# Patient Record
Sex: FEMALE | Race: BLACK OR AFRICAN AMERICAN | URBAN - METROPOLITAN AREA
[De-identification: names, ages, dates, MRNs, and addresses within clinical notes are randomized per-mention and may not be internally consistent; named-entity substitution may affect disease eponyms.]

---

## 2017-10-16 ENCOUNTER — OFFICE VISIT (OUTPATIENT)
Dept: FAMILY MEDICINE CLINIC | Age: 42
End: 2017-10-16

## 2017-10-16 VITALS
TEMPERATURE: 98.3 F | BODY MASS INDEX: 33.8 KG/M2 | SYSTOLIC BLOOD PRESSURE: 178 MMHG | RESPIRATION RATE: 17 BRPM | HEIGHT: 64 IN | WEIGHT: 198 LBS | OXYGEN SATURATION: 100 % | HEART RATE: 42 BPM | DIASTOLIC BLOOD PRESSURE: 96 MMHG

## 2017-10-16 DIAGNOSIS — R01.1 HEART MURMUR: ICD-10-CM

## 2017-10-16 DIAGNOSIS — I10 ESSENTIAL HYPERTENSION WITH GOAL BLOOD PRESSURE LESS THAN 140/90: Primary | ICD-10-CM

## 2017-10-16 DIAGNOSIS — R00.1 BRADYCARDIA: ICD-10-CM

## 2017-10-16 RX ORDER — LISINOPRIL AND HYDROCHLOROTHIAZIDE 10; 12.5 MG/1; MG/1
1 TABLET ORAL DAILY
Qty: 30 TAB | Refills: 5 | Status: SHIPPED | OUTPATIENT
Start: 2017-10-16 | End: 2018-10-15 | Stop reason: SDUPTHER

## 2017-10-16 NOTE — PROGRESS NOTES
HPI  Giovanny Liang is a 39 y.o. female being seen today for   Chief Complaint   Patient presents with    Medication Refill     Patient feels \"ill\" when taking HTN  meds- last pill 6 March 2017    . she states that she was seen a year ago, had 6 mos of bp meds and now is out for a month. Stressed out lately due to custody hearing. Per pt she was evalutaed last year at [facility] for elevated bp and she had a cardiac MRI when she was there due to concern of cardiac sarcoid. Per pt everything was normal and she did not need any specific follow up. Past Medical History:   Diagnosis Date    Hypertension 2007         ROS  Patient states that she is feeling well. Denies complaints of chest pain, shortness of breath, swelling of legs, dizziness or weakness. she denies nausea, vomiting or diarrhea. Current Outpatient Prescriptions   Medication Sig    lisinopril-hydroCHLOROthiazide (PRINZIDE, ZESTORETIC) 10-12.5 mg per tablet Take 1 Tab by mouth daily.  amLODIPine (NORVASC) 5 mg tablet Take 1 Tab by mouth daily. No current facility-administered medications for this visit. PE  Visit Vitals    BP (!) 178/96 (BP 1 Location: Right arm, BP Patient Position: Sitting)    Pulse (!) 42    Temp 98.3 °F (36.8 °C) (Oral)    Resp 17    Ht 5' 4\" (1.626 m)    Wt 198 lb (89.8 kg)    LMP 10/16/2017 (Exact Date)    SpO2 100%    BMI 33.99 kg/m2        Alert and oriented with normal mood and affect. she is well developed and well nourished . Lungs are clear without wheezing. Heart rate is regular with 3/6 systolic murmur. There is no lower extremity edema. Assessment and Plan:        ICD-10-CM ICD-9-CM    1. Essential hypertension with goal blood pressure less than 140/90 I10 401.9    2. Heart murmur R01.1 785.2    3.  Bradycardia R00.1 427.89      Change meds to lisin/hctz 10/12.5 for cost  rtc 2 mos recheck bp and labs  [facility]    -on [facility], cannot view MRI but can see part of H and P and there was concern for heart block.     As pt is persistently bradycardic, will refer for EKG and request full records from Adarsh Garcia MD

## 2017-10-16 NOTE — MR AVS SNAPSHOT
Visit Information Date & Time Provider Department Dept. Phone Encounter #  
 10/16/2017 12:15 PM [facility] [id number] Upcoming Health Maintenance Date Due DTaP/Tdap/Td series (1 - Tdap) 10/28/1996 PAP AKA CERVICAL CYTOLOGY 9/24/2016 INFLUENZA AGE 9 TO ADULT 8/1/2017 Allergies as of 10/16/2017  Review Complete On: 10/16/2017 By: Pipe Gloria No Known Allergies Current Immunizations  Never Reviewed No immunizations on file. Not reviewed this visit You Were Diagnosed With   
  
 Codes Comments Essential hypertension with goal blood pressure less than 140/90    -  Primary ICD-10-CM: I10 
ICD-9-CM: 401.9 Heart murmur     ICD-10-CM: R01.1 ICD-9-CM: 785.2 Bradycardia     ICD-10-CM: R00.1 ICD-9-CM: 427.89 Vitals BP Pulse Temp Resp Height(growth percentile) Weight(growth percentile) (!) 178/96 (BP 1 Location: Right arm, BP Patient Position: Sitting) (!) 42 98.3 °F (36.8 °C) (Oral) 17 5' 4\" (1.626 m) 198 lb (89.8 kg) LMP SpO2 BMI OB Status Smoking Status 10/16/2017 (Exact Date) 100% 33.99 kg/m2 Having regular periods Never Smoker Vitals History BMI and BSA Data Body Mass Index Body Surface Area  
 33.99 kg/m 2 2.01 m 2 Preferred Pharmacy Pharmacy Name Phone [address] [place] [address] [phone] Your Updated Medication List  
  
   
This list is accurate as of: 10/16/17  1:12 PM.  Always use your most recent med list. amLODIPine 5 mg tablet Commonly known as:  Love Breach Take 1 Tab by mouth daily. lisinopril-hydroCHLOROthiazide 10-12.5 mg per tablet Commonly known as:  Gerri Lush Take 1 Tab by mouth daily. Prescriptions Sent to Pharmacy Refills  
 lisinopril-hydroCHLOROthiazide (PRINZIDE, ZESTORETIC) 10-12.5 mg per tablet 5 Sig: Take 1 Tab by mouth daily. Class: Normal  
 Pharmacy: [address], [address] Ph #: [phone] Route: Oral  
  
Introducing [facility]! Dear Donya Dennis: Thank you for requesting a [employer] account. Our records indicate that you already have an active [employer] account. You can access your account anytime at [email or web address]. [email or web address] Did you know that you can access your hospital and ER discharge instructions at any time in [employer]? You can also review all of your test results from your hospital stay or ER visit. Additional Information If you have questions, please visit the Frequently Asked Questions section of the [employer] website at [email or web address] Remember, [employer] is NOT to be used for urgent needs. For medical emergencies, dial 911. Now available from your iPhone and Android! Please provide this summary of care documentation to your next provider. If you have any questions after today's visit, please call [phone].

## 2017-10-16 NOTE — PROGRESS NOTES
No chief complaint on file. 1. Have you been to the ER, urgent care clinic since your last visit? Hospitalized since your last visit? No    2. Have you seen or consulted any other health care providers outside of the [address] [place] [address] since your last visit? No    3. When was your last Pap smear? [facility] 9/2017 - patient will  Ask records be forwarded  When was your last Mammogram? [facility] 9/2017- Patient had small cysts- will be repeated in 6 months -records to be forwarded. When was your last Colon screening? N/A    PMH/FH/Social Hx reviewed and updated as needed      Applicable screenings reviewed and updated as needed  Medication reconciliation performed. Patient does  need medication refills. Health Maintenance reviewed.

## 2017-10-17 ENCOUNTER — TELEPHONE (OUTPATIENT)
Dept: FAMILY MEDICINE CLINIC | Age: 42
End: 2017-10-17

## 2017-10-17 NOTE — TELEPHONE ENCOUNTER
Hood rinaldi    Can you contact this nice patient and let her know she needs to go have EKG done at hospital.  She has history of abnormal EKG at [facility] last year, needs follow up. She can have done at [facility]. I placed order.      Thanks very much  LANI

## 2017-10-18 NOTE — TELEPHONE ENCOUNTER
Attempted to contact patient regarding need for EKG test. No answer, left voicemail for patient to return call. Provided patient with [employer] number [phone].

## 2017-10-20 NOTE — TELEPHONE ENCOUNTER
Attempted to contact patient again this afternoon. No answer, left voicemail for patient to return call to office.

## 2017-10-25 NOTE — TELEPHONE ENCOUNTER
Third time attempting to contact patient. No answer. Mailbox is currently full. Will attempt one additional time at later date.

## 2017-10-31 NOTE — TELEPHONE ENCOUNTER
Incoming call from patient this afternoon. Two patient identifiers confirmed. Advised patient per provider note that she needs to have EKG done at the hospital. Patient verbalized understand and stated she will have EKG done at [facility] this week.

## 2017-12-04 PROBLEM — R92.8 ABNORMAL SCREENING MAMMOGRAM: Status: ACTIVE | Noted: 2017-12-04

## 2018-10-15 ENCOUNTER — OFFICE VISIT (OUTPATIENT)
Dept: FAMILY MEDICINE CLINIC | Age: 43
End: 2018-10-15

## 2018-10-15 VITALS
RESPIRATION RATE: 18 BRPM | OXYGEN SATURATION: 99 % | SYSTOLIC BLOOD PRESSURE: 150 MMHG | TEMPERATURE: 98.8 F | WEIGHT: 170 LBS | HEIGHT: 64 IN | BODY MASS INDEX: 29.02 KG/M2 | HEART RATE: 43 BPM | DIASTOLIC BLOOD PRESSURE: 96 MMHG

## 2018-10-15 DIAGNOSIS — I44.0 FIRST DEGREE HEART BLOCK: ICD-10-CM

## 2018-10-15 DIAGNOSIS — I10 ESSENTIAL HYPERTENSION WITH GOAL BLOOD PRESSURE LESS THAN 140/90: Primary | ICD-10-CM

## 2018-10-15 RX ORDER — LISINOPRIL AND HYDROCHLOROTHIAZIDE 10; 12.5 MG/1; MG/1
1 TABLET ORAL DAILY
Qty: 30 TAB | Refills: 5 | Status: SHIPPED | OUTPATIENT
Start: 2018-10-15 | End: 2019-01-04

## 2018-10-15 NOTE — PROGRESS NOTES
HPI 
Junior Nunez is a 43 y.o. female being seen today for Chief Complaint Patient presents with  Follow-up  
  medicate and monitor Hayley Bourgeois followup for this pt not seen in over a year. Hx bradycardia, htn.   she states that her birth control implants cause her low pulse but she does have chart history of possible heart block at [facility]. Past Medical History:  
Diagnosis Date  Hypertension 2007 ROS Patient states that she is feeling well. Denies complaints of chest pain, shortness of breath, swelling of legs, dizziness or weakness. she denies nausea, vomiting or diarrhea. Current Outpatient Prescriptions Medication Sig  
 lisinopril-hydroCHLOROthiazide (PRINZIDE, ZESTORETIC) 10-12.5 mg per tablet Take 1 Tab by mouth daily. No current facility-administered medications for this visit. PE Visit Vitals  BP (!) 150/96 (BP 1 Location: Left arm, BP Patient Position: Sitting)  Pulse (!) 43  Temp 98.8 °F (37.1 °C) (Temporal)  Resp 18  Ht 5' 4\" (1.626 m)  Wt 170 lb (77.1 kg) Comment: Patient reported  LMP 09/24/2018 (Approximate)  SpO2 99%  BMI 29.18 kg/m2 Alert and oriented with normal mood and affect. she is well developed and well nourished . Lungs are clear without wheezing. Heart rate is regular without murmurs or gallops. There is no lower extremity edema. Assessment and Plan: ICD-10-CM ICD-9-CM 1. Essential hypertension with goal blood pressure less than 140/90 I10 401.9 2. First degree heart block I44.0 426.11 Refill bp med Request records from [place] [facility]\""TL a gain 
ekg shows first degree heart block and e/o LVH 
 
rtc 3 mos recheck bp, labs, review cardio records.    
 
Belinda Orozco MD

## 2018-12-14 ENCOUNTER — TELEPHONE (OUTPATIENT)
Dept: FAMILY MEDICINE CLINIC | Age: 43
End: 2018-12-14

## 2018-12-14 DIAGNOSIS — I45.9 HEART BLOCK: Primary | ICD-10-CM

## 2018-12-14 NOTE — TELEPHONE ENCOUNTER
Hood perez    This is the patient I told you about with heart block on her [facility] records. I have drafted a letter for her and would like you to call her as well to schedule a cardiology appointment as soon as she can get in. Will place [facility] records in the scan queue for media tab.

## 2018-12-17 NOTE — TELEPHONE ENCOUNTER
Attempted to call patient to follow up on cardiology refferrall.  Left patient a message to give the office a call back

## 2019-01-04 RX ORDER — LISINOPRIL AND HYDROCHLOROTHIAZIDE 20; 25 MG/1; MG/1
0.5 TABLET ORAL DAILY
Qty: 45 TAB | Refills: 1 | Status: SHIPPED | OUTPATIENT
Start: 2019-01-04

## 2019-01-04 NOTE — TELEPHONE ENCOUNTER
Received request from pharmacy to change med to lisinopril hctz 20/25 due to cost. She can take a half pill of the 20/25

## 2019-01-14 ENCOUNTER — OFFICE VISIT (OUTPATIENT)
Dept: CARDIOLOGY CLINIC | Age: 44
End: 2019-01-14

## 2019-01-14 VITALS
HEART RATE: 43 BPM | DIASTOLIC BLOOD PRESSURE: 77 MMHG | OXYGEN SATURATION: 98 % | BODY MASS INDEX: 34.5 KG/M2 | SYSTOLIC BLOOD PRESSURE: 194 MMHG | WEIGHT: 201 LBS

## 2019-01-14 DIAGNOSIS — R00.2 PALPITATIONS: ICD-10-CM

## 2019-01-14 DIAGNOSIS — I10 ESSENTIAL HYPERTENSION WITH GOAL BLOOD PRESSURE LESS THAN 140/90: Primary | ICD-10-CM

## 2019-01-14 DIAGNOSIS — R01.1 HEART MURMUR: ICD-10-CM

## 2019-01-14 DIAGNOSIS — R00.1 BRADYCARDIA: ICD-10-CM

## 2019-01-14 DIAGNOSIS — I44.0 FIRST DEGREE HEART BLOCK: ICD-10-CM

## 2019-01-14 DIAGNOSIS — I44.1 SECOND DEGREE AV BLOCK: ICD-10-CM

## 2019-01-14 RX ORDER — BISMUTH SUBSALICYLATE 262 MG
1 TABLET,CHEWABLE ORAL DAILY
COMMUNITY

## 2019-01-14 NOTE — PATIENT INSTRUCTIONS
Reymundo Dubois will call to schedule your testing within 24-48 hours. If you do not hear from her, then please call her directly at [phone].     All testing/lab work is completed in [address] [place] [address]   at [facility]

## 2019-01-14 NOTE — PROGRESS NOTES
1. Have you been to the ER, urgent care clinic since your last visit? Hospitalized since your last visit? No     2. Have you seen or consulted any other health care providers outside of the [address] [place] [address] since your last visit? Include any pap smears or colon screening.   No

## 2019-01-26 PROBLEM — I44.1 SECOND DEGREE AV BLOCK: Status: ACTIVE | Noted: 2019-01-26

## 2019-01-26 NOTE — PROGRESS NOTES
Subjective:      Nicole Cummings is in the office today for cardiac evaluation. She is a 29-year-old woman that has at least a 2-year history of a slow heart rate. The patient reports that she was hospitalized at [address] [place] [address] in [place] approximately 2 years prior to this evaluation. At that time she said her heart rate was very low and her blood pressure was elevated. She was offered a pacemaker, but did not want to have a pacemaker done at that time. The patient has had no history of syncope. She did have a history of near syncope a \"long time ago \". She denies dizziness. She denies excessive fatigue. She has had no limiting shortness of breath. She has had no swelling. Patient's cardiac risk factors are hypertension. Patient Active Problem List    Diagnosis Date Noted    Second degree AV block 2019    First degree heart block 10/15/2018    Abnormal screening mammogram 2017    Bradycardia 10/16/2017    Heart murmur 10/16/2017    Essential hypertension with goal blood pressure less than 140/90 2016    Dermoid cyst of scalp 2016     Current Outpatient Medications   Medication Sig Dispense Refill    multivitamin (ONE A DAY) tablet Take 1 Tab by mouth daily.  lisinopril-hydroCHLOROthiazide (PRINZIDE, ZESTORETIC) 20-25 mg per tablet Take 0.5 Tabs by mouth daily.  39 Tab 1     No Known Allergies  Past Medical History:   Diagnosis Date    Hypertension      Past Surgical History:   Procedure Laterality Date    HX  SECTION  2008     Family History   Problem Relation Age of Onset    Hypertension Mother     Diabetes Mother     Hypertension Father     No Known Problems Brother      Social History     Tobacco Use   Smoking Status Never Smoker   Smokeless Tobacco Never Used          Review of Systems, additional:  Constitutional: negative  Eyes: negative  Respiratory: negative  Cardiovascular: negative  Gastrointestinal: negative  Musculoskeletal:negative  Neurological: negative  Behvioral/Psych: negative  Endocrine: negative  ENT: negative    Objective:     Visit Vitals  /77   Pulse (!) 43   Wt 201 lb (91.2 kg)   SpO2 98%   BMI 34.50 kg/m²     General:  alert, cooperative, no distress   Chest Wall: inspection normal - no chest wall deformities or tenderness, respiratory effort normal   Lung: clear to auscultation bilaterally   Heart:  normal rate and regular rhythm, S1 and S2 normal, no gallops noted, soft systolic ejection murmur    Abdomen: soft, non-tender. Bowel sounds normal. No masses,  no organomegaly   Extremities: extremities normal, atraumatic, no cyanosis or edema Skin: no rashes   Neuro: alert, oriented, normal speech, no focal findings or movement disorder noted     EK19. Second-degree AV block with 2-1 conduction. Poor R wave progression. Diffuse nondiagnostic ST and T wave abnormalities. Assessment/Plan:       ICD-10-CM ICD-9-CM    1. Essential hypertension with goal blood pressure less than 140/90. Systolic blood pressure markedly elevated the office today. I10 401.9 AMB POC EKG ROUTINE W/ 12 LEADS, INTER & REP   2. Palpitations R00.2 785.1 TSH AND FREE T4      ECHO ADULT COMPLETE      CARDIAC HOLTER MONITOR   3. First degree heart block, history of I44.0 426.11    4. Bradycardia R00.1 427.89    5. Heart murmur R01.1 785.2    6. Second degree AV block, resting electrocardiogram in the office today demonstrates bradycardia with a heart rate of 44. There appears to be 2-1 block. Will order an echocardiogram and 48 oh hour Holter monitor as well as thyroid function studies. Return in 3 weeks.  I44.1 426.13

## 2022-03-18 PROBLEM — R00.1 BRADYCARDIA: Status: ACTIVE | Noted: 2017-10-16

## 2022-03-18 PROBLEM — I44.1 SECOND DEGREE AV BLOCK: Status: ACTIVE | Noted: 2019-01-26

## 2022-03-19 PROBLEM — R01.1 HEART MURMUR: Status: ACTIVE | Noted: 2017-10-16

## 2022-03-20 PROBLEM — R92.8 ABNORMAL SCREENING MAMMOGRAM: Status: ACTIVE | Noted: 2017-12-04

## 2022-03-20 PROBLEM — I44.0 FIRST DEGREE HEART BLOCK: Status: ACTIVE | Noted: 2018-10-15

## 2022-07-13 ENCOUNTER — OFFICE VISIT (OUTPATIENT)
Dept: CARDIOLOGY CLINIC | Age: 47
End: 2022-07-13
Payer: COMMERCIAL

## 2022-07-13 VITALS
OXYGEN SATURATION: 98 % | HEIGHT: 64 IN | DIASTOLIC BLOOD PRESSURE: 83 MMHG | BODY MASS INDEX: 36.66 KG/M2 | TEMPERATURE: 98.7 F | HEART RATE: 46 BPM | WEIGHT: 214.7 LBS | SYSTOLIC BLOOD PRESSURE: 138 MMHG | RESPIRATION RATE: 16 BRPM

## 2022-07-13 DIAGNOSIS — I44.0 FIRST DEGREE HEART BLOCK: ICD-10-CM

## 2022-07-13 DIAGNOSIS — I45.9 HEART BLOCK: ICD-10-CM

## 2022-07-13 DIAGNOSIS — R01.1 HEART MURMUR: ICD-10-CM

## 2022-07-13 DIAGNOSIS — R00.1 BRADYCARDIA: Primary | ICD-10-CM

## 2022-07-13 DIAGNOSIS — I10 ESSENTIAL HYPERTENSION WITH GOAL BLOOD PRESSURE LESS THAN 140/90: ICD-10-CM

## 2022-07-13 DIAGNOSIS — R01.1 MURMUR: ICD-10-CM

## 2022-07-13 DIAGNOSIS — I44.1 SECOND DEGREE AV BLOCK: ICD-10-CM

## 2022-07-13 PROCEDURE — 99204 OFFICE O/P NEW MOD 45 MIN: CPT | Performed by: INTERNAL MEDICINE

## 2022-07-13 PROCEDURE — 93000 ELECTROCARDIOGRAM COMPLETE: CPT | Performed by: INTERNAL MEDICINE

## 2022-07-13 RX ORDER — LISINOPRIL 10 MG/1
TABLET ORAL
COMMUNITY
Start: 2022-06-24

## 2022-07-13 NOTE — PATIENT INSTRUCTIONS
Testing   Echo    48 HR Holter    [employer]-will be calling you to confirm your address to send your Heart Monitor. Please allow 7-10 business days for monitor to arrive at your home.   Customer Service for [employer]:  [phone]    **call office 3-5 days after testing is completed for results**

## 2022-07-13 NOTE — PROGRESS NOTES
Cyril Pinedo presents today for   Chief Complaint   Patient presents with    New Patient     re-establish care    Other     bradycardia/ heart murmur       Is someone accompanying this pt? no    Is the patient using any DME equipment during OV? no    Depression Screening:  3 most recent PHQ Screens 7/13/2022   Little interest or pleasure in doing things Not at all   Feeling down, depressed, irritable, or hopeless Not at all   Total Score PHQ 2 0       Learning Assessment:  Learning Assessment 9/19/2016   PRIMARY LEARNER Patient   PRIMARY LANGUAGE ENGLISH   LEARNER PREFERENCE PRIMARY READING   ANSWERED BY patient   RELATIONSHIP SELF       Coordination of Care:  1. Have you been to the ER, urgent care clinic since your last visit? no  Hospitalized since your last visit? no    2. Have you seen or consulted any other health care providers outside of the [address] [place] [address] since your last visit? no Include any pap smears or colon screening.  no

## 2022-07-19 ENCOUNTER — TELEPHONE (OUTPATIENT)
Dept: CARDIOLOGY CLINIC | Age: 47
End: 2022-07-19

## 2022-07-19 NOTE — TELEPHONE ENCOUNTER
Attempted to contact pt at  number, no answer. Lvm for pt to return call to office at [phone]. Will continue to try to contact pt. RE: pt enrolled yesterday, 7/18.

## 2022-07-19 NOTE — TELEPHONE ENCOUNTER
Attempted to contact pt at  number, no answer. Lvm for pt to return call to office at [phone]. Will continue to try to contact pt.

## 2022-07-19 NOTE — TELEPHONE ENCOUNTER
Patient called [employer] ad they informed her that she  Was not enrolled yet. Patient wanted to know what that meant and if she is still going to receive monitor.  Patient is going out of town end of july

## 2022-07-20 NOTE — TELEPHONE ENCOUNTER
Contacted pt at [facility] number. Two patient Identifiers confirmed. Advised pt she was now registered with [employer]. Pt verbalized understanding.

## 2022-07-28 NOTE — PROGRESS NOTES
Subjective:      Alejandra Pineda is in the office today for cardiac evaluation. She is a 70-year-old woman that has at least a 5-year history of a slow heart rate. The patient reports that she was hospitalized at [address] [place] [address] in [place] approximately 5 years ago. At that time she said her heart rate was very slow and her blood pressure was elevated. She was offered a pacemaker, but did not want to have a pacemaker done at that time. At her 2019 appointment, the patient reported no history of syncope. She did report a history of near syncope a \"long time ago \". She denied dizziness. She denied excessive fatigue. She  had no limiting shortness of breath. She  had no swelling. In the office today, she reports no real changes. She has had no dizziness, near syncope or syncope. Patient's cardiac risk factors are hypertension. Patient Active Problem List    Diagnosis Date Noted    Second degree AV block 2019    First degree heart block 10/15/2018    Abnormal screening mammogram 2017    Bradycardia 10/16/2017    Heart murmur 10/16/2017    Essential hypertension with goal blood pressure less than 140/90 2016    Dermoid cyst of scalp 2016     Current Outpatient Medications   Medication Sig Dispense Refill    lisinopriL (PRINIVIL, ZESTRIL) 10 mg tablet       multivitamin (ONE A DAY) tablet Take 1 Tab by mouth daily. (Patient not taking: Reported on 2022)      lisinopril-hydroCHLOROthiazide (PRINZIDE, ZESTORETIC) 20-25 mg per tablet Take 0.5 Tabs by mouth daily.  (Patient not taking: Reported on 2022) 45 Tab 1     No Known Allergies  Past Medical History:   Diagnosis Date    Hypertension 2007     Past Surgical History:   Procedure Laterality Date    HX  SECTION  2008     Family History   Problem Relation Age of Onset    Hypertension Mother     Diabetes Mother     Hypertension Father     No Known Problems Brother      Social History     Tobacco Use   Smoking Status Never Smokeless Tobacco Never          Review of Systems, additional:  Constitutional: negative  Eyes: negative  Respiratory: negative  Cardiovascular: negative  Gastrointestinal: negative  Musculoskeletal:negative  Neurological: negative  Behvioral/Psych: negative  Endocrine: negative  ENT: negative    Objective:     Visit Vitals  /83   Pulse (!) 46   Temp 98.7 °F (37.1 °C) (Temporal)   Resp 16   Ht 5' 4\" (1.626 m)   Wt 97.4 kg (214 lb 11.2 oz)   SpO2 98%   BMI 36.85 kg/m²     General:  alert, cooperative, no distress   Chest Wall: inspection normal - no chest wall deformities or tenderness, respiratory effort normal   Lung: clear to auscultation bilaterally   Heart:  normal rate and regular rhythm, S1 and S2 normal, no gallops noted, soft systolic ejection murmur    Abdomen: soft, non-tender. Bowel sounds normal. No masses,  no organomegaly   Extremities: extremities normal, atraumatic, no cyanosis or edema Skin: no rashes   Neuro: alert, oriented, normal speech, no focal findings or movement disorder noted     EK19. Second-degree AV block with 2-1 conduction. Poor R wave progression. Diffuse nondiagnostic ST and T wave abnormalities. 2022. Sinus rhythm with 2-1 AV block. Lead switch. Assessment/Plan:       ICD-10-CM ICD-9-CM    1. Essential hypertension with goal blood pressure less than 140/90. Systolic blood pressure moderately elevated the office today. I10 401.9 AMB POC EKG ROUTINE W/ 12 LEADS, INTER & REP   2. Palpitations R00.2 785.1 TSH AND FREE T4      ECHO ADULT COMPLETE      CARDIAC HOLTER MONITOR   3. First degree heart block, history of I44.0 426.11    4. Bradycardia R00.1 427.89    5. Heart murmur R01.1 785.2    6. Second degree AV block, resting electrocardiogram in the office today demonstrates bradycardia with a heart rate of 46. There was be 2-1 block. She had similar findings at a previous visit of 2019.   Will order an echocardiogram and 48 hour Holter monitor. Return in 5 weeks.  I44.1 426.13

## 2022-08-02 ENCOUNTER — TELEPHONE (OUTPATIENT)
Dept: CARDIOLOGY CLINIC | Age: 47
End: 2022-08-02

## 2022-08-02 NOTE — TELEPHONE ENCOUNTER
[employer] called to report an abnormal holter report for the patient. [phone].  Report is completed and is online

## 2022-08-03 NOTE — TELEPHONE ENCOUNTER
Contacted pt at [facility]. Two patient Identifiers confirmed. Advised pt per Dr Dillon Farah. Scheduled for 8/31, pt going out of town. Pt verbalized understanding.

## 2022-08-03 NOTE — TELEPHONE ENCOUNTER
Attempted to contact pt at  number, no answer. Lvm for pt to return call to office at [phone]. Will continue to try to contact pt. RE: Pt needs August appt. Currently scheduled for 9/14.

## 2022-08-31 DIAGNOSIS — R00.1 BRADYCARDIA: ICD-10-CM

## 2022-08-31 DIAGNOSIS — I10 ESSENTIAL HYPERTENSION WITH GOAL BLOOD PRESSURE LESS THAN 140/90: ICD-10-CM

## 2022-08-31 DIAGNOSIS — I45.9 HEART BLOCK: ICD-10-CM

## 2022-08-31 DIAGNOSIS — I44.1 SECOND DEGREE AV BLOCK: ICD-10-CM

## 2022-08-31 DIAGNOSIS — R01.1 HEART MURMUR: ICD-10-CM

## 2022-08-31 DIAGNOSIS — R01.1 MURMUR: ICD-10-CM

## 2022-08-31 DIAGNOSIS — I44.0 FIRST DEGREE HEART BLOCK: ICD-10-CM

## 2022-09-13 ENCOUNTER — TELEPHONE (OUTPATIENT)
Dept: CARDIOLOGY CLINIC | Age: 47
End: 2022-09-13

## 2022-12-15 ENCOUNTER — TELEPHONE (OUTPATIENT)
Dept: CARDIOLOGY CLINIC | Age: 47
End: 2022-12-15

## 2023-01-06 ENCOUNTER — OFFICE VISIT (OUTPATIENT)
Dept: CARDIOLOGY CLINIC | Age: 48
End: 2023-01-06
Payer: COMMERCIAL

## 2023-01-06 VITALS
HEART RATE: 42 BPM | OXYGEN SATURATION: 100 % | BODY MASS INDEX: 36.7 KG/M2 | WEIGHT: 215 LBS | HEIGHT: 64 IN | DIASTOLIC BLOOD PRESSURE: 98 MMHG | SYSTOLIC BLOOD PRESSURE: 210 MMHG

## 2023-01-06 DIAGNOSIS — I44.1 SECOND DEGREE AV BLOCK: Primary | ICD-10-CM

## 2023-01-06 RX ORDER — ERGOCALCIFEROL 1.25 MG/1
50000 CAPSULE ORAL
COMMUNITY
Start: 2022-12-15

## 2023-01-06 NOTE — PATIENT INSTRUCTIONS
Other   2 week Bp check     New Location Address- projected for the month of February 2023    [facility] [address] [facility] [place] [address]

## 2023-01-20 ENCOUNTER — CLINICAL SUPPORT (OUTPATIENT)
Dept: CARDIOLOGY CLINIC | Age: 48
End: 2023-01-20

## 2023-01-20 VITALS — DIASTOLIC BLOOD PRESSURE: 84 MMHG | SYSTOLIC BLOOD PRESSURE: 128 MMHG | HEART RATE: 54 BPM

## 2023-01-20 DIAGNOSIS — Z01.30 BLOOD PRESSURE CHECK: Primary | ICD-10-CM

## 2023-01-20 NOTE — PROGRESS NOTES
Bill Thurston was seen in our office today for BP evaluation. Listed below are the patients current meds:      Current Outpatient Medications:     lisinopriL (PRINIVIL, ZESTRIL) 10 mg tablet, Take 10 mg by mouth daily. , Disp: , Rfl:     ergocalciferol (ERGOCALCIFEROL) 1,250 mcg (50,000 unit) capsule, Take 50,000 Units by mouth every seven (7) days. , Disp: , Rfl:       No current facility-administered medications for this visit. Visit Vitals  /84   Pulse (!) 54       Plan:     Patient to continue current medications. Advised patient that I would forward to Dr. Laurel Stanford for review and further instructions. Advised patient that we would call within 24-48 hours with any changes. Patient verbalized understanding.

## 2023-01-21 NOTE — PROGRESS NOTES
Subjective:      Modesto Yang is in the office today for cardiac evaluation. She is a 66-year-old woman that has at least a 5-year history of a slow heart rate. The patient reports that she was hospitalized at [facility] in [place] approximately 5 years ago. At that time she said her heart rate was very slow and her blood pressure was elevated. She was offered a pacemaker, but did not want to have a pacemaker done at that time. At her 2019 appointment, the patient reported no history of syncope. She did report a history of near syncope a \"long time ago \". She denied dizziness. She denied excessive fatigue. She  had no limiting shortness of breath. She  had no swelling. In the office today, she reports that she feels \"okay \". She has had no chest pain or shortness of breath. Her Holter and echocardiogram were reviewed with her in the office today. Patient's cardiac risk factors are hypertension. Patient Active Problem List    Diagnosis Date Noted    Second degree AV block 2019    First degree heart block 10/15/2018    Abnormal screening mammogram 2017    Bradycardia 10/16/2017    Heart murmur 10/16/2017    Essential hypertension with goal blood pressure less than 140/90 2016    Dermoid cyst of scalp 2016     Current Outpatient Medications   Medication Sig Dispense Refill    ergocalciferol (ERGOCALCIFEROL) 1,250 mcg (50,000 unit) capsule Take 50,000 Units by mouth every seven (7) days. lisinopriL (PRINIVIL, ZESTRIL) 10 mg tablet Take 10 mg by mouth daily.        No Known Allergies  Past Medical History:   Diagnosis Date    Hypertension      Past Surgical History:   Procedure Laterality Date    HX  SECTION  2008     Family History   Problem Relation Age of Onset    Hypertension Mother     Diabetes Mother     Hypertension Father     No Known Problems Brother      Social History     Tobacco Use   Smoking Status Never   Smokeless Tobacco Never          Review of Systems, additional:  Constitutional: negative  Eyes: negative  Respiratory: negative  Cardiovascular: negative  Gastrointestinal: negative  Musculoskeletal:negative  Neurological: negative  Behvioral/Psych: negative  Endocrine: negative  ENT: negative    Objective:     Visit Vitals  BP (!) 210/98 (BP 1 Location: Left upper arm, BP Patient Position: Sitting, BP Cuff Size: Large adult)   Pulse (!) 42   Ht 5' 4\" (1.626 m)   Wt 97.5 kg (215 lb)   SpO2 100%   BMI 36.90 kg/m²     General:  alert, cooperative, no distress   Chest Wall: inspection normal - no chest wall deformities or tenderness, respiratory effort normal   Lung: clear to auscultation bilaterally   Heart:  normal rate and regular rhythm, S1 and S2 normal, no gallops noted, soft systolic ejection murmur    Abdomen: soft, non-tender. Bowel sounds normal. No masses,  no organomegaly   Extremities: extremities normal, atraumatic, no cyanosis or edema Skin: no rashes   Neuro: alert, oriented, normal speech, no focal findings or movement disorder noted     EK19. Second-degree AV block with 2-1 conduction. Poor R wave progression. Diffuse nondiagnostic ST and T wave abnormalities. 2022. Sinus rhythm with 2-1 AV block. Lead switch. Assessment/Plan:       ICD-10-CM ICD-9-CM    1. Essential hypertension with goal blood pressure less than 140/90. Systolic blood pressure moderately elevated the office today. I10 401.9 AMB POC EKG ROUTINE W/ 12 LEADS, INTER & REP   2. Palpitations R00.2 785.1 TSH AND FREE T4      ECHO ADULT COMPLETE      CARDIAC HOLTER MONITOR   3. First degree heart block, history of I44.0 426.11    4. Bradycardia R00.1 427.89    5. Heart murmur R01.1 785.2    6. Second degree AV block, resting electrocardiogram in the office at her prior visit of 2022 demonstrated bradycardia with a heart rate of 46. There was 2-1 block. She had similar findings at a previous visit of 2019.   Ordered an echocardiogram which was completed on 12/19/2022. EF was 60 to 65%. There was mild to moderate TR. There was mild MR. The right ventricle was reported as being mild to moderately dilated. A 48 hour Holter monitor was ordered and completed on 7/23/2022. The patient did have episodes of  second-degree heart block. She remains asymptomatic.   She will return for blood pressure check in 2 weeks and an appointment in 3 months  I44.1 426.13

## 2023-05-01 ENCOUNTER — HOSPITAL ENCOUNTER (OUTPATIENT)
Facility: HOSPITAL | Age: 48
Setting detail: SPECIMEN
Discharge: HOME | End: 2023-05-04

## 2023-05-01 LAB — LABCORP SPECIMEN COLLECTION: NORMAL

## 2023-05-01 PROCEDURE — 99001 SPECIMEN HANDLING PT-LAB: CPT

## 2023-05-02 LAB
T4 FREE SERPL-MCNC: 1.18 NG/DL (ref 0.82–1.77)
TSH SERPL DL<=0.005 MIU/L-ACNC: 0.93 UIU/ML (ref 0.45–4.5)

## 2023-05-24 ENCOUNTER — OFFICE VISIT (OUTPATIENT)
Age: 48
End: 2023-05-24
Payer: MEDICAID

## 2023-05-24 VITALS
DIASTOLIC BLOOD PRESSURE: 86 MMHG | OXYGEN SATURATION: 98 % | HEART RATE: 50 BPM | SYSTOLIC BLOOD PRESSURE: 138 MMHG | BODY MASS INDEX: 34.84 KG/M2 | WEIGHT: 203 LBS

## 2023-05-24 DIAGNOSIS — I44.1 SECOND DEGREE AV BLOCK: Primary | ICD-10-CM

## 2023-05-24 PROCEDURE — 99214 OFFICE O/P EST MOD 30 MIN: CPT | Performed by: INTERNAL MEDICINE

## 2023-05-24 PROCEDURE — 3078F DIAST BP <80 MM HG: CPT | Performed by: INTERNAL MEDICINE

## 2023-05-24 PROCEDURE — 3074F SYST BP LT 130 MM HG: CPT | Performed by: INTERNAL MEDICINE

## 2023-05-24 RX ORDER — LISINOPRIL 10 MG/1
10 TABLET ORAL DAILY
COMMUNITY

## 2023-05-24 RX ORDER — ERGOCALCIFEROL 1.25 MG/1
1 CAPSULE ORAL
COMMUNITY
Start: 2023-05-23

## 2023-05-24 ASSESSMENT — PATIENT HEALTH QUESTIONNAIRE - PHQ9
SUM OF ALL RESPONSES TO PHQ QUESTIONS 1-9: 0
1. LITTLE INTEREST OR PLEASURE IN DOING THINGS: 0
SUM OF ALL RESPONSES TO PHQ9 QUESTIONS 1 & 2: 0
SUM OF ALL RESPONSES TO PHQ QUESTIONS 1-9: 0
SUM OF ALL RESPONSES TO PHQ QUESTIONS 1-9: 0
2. FEELING DOWN, DEPRESSED OR HOPELESS: 0
SUM OF ALL RESPONSES TO PHQ QUESTIONS 1-9: 0

## 2023-05-30 NOTE — PROGRESS NOTES
Smoking Status Never   Smokeless Tobacco Never          Review of Systems, additional:  Constitutional: negative  Eyes: negative  Respiratory: negative  Cardiovascular: negative  Gastrointestinal: negative  Musculoskeletal:negative  Neurological: negative  Behvioral/Psych: negative  Endocrine: negative  ENT: negative    Objective:         General:  alert, cooperative, no distress   Chest Wall: inspection normal - no chest wall deformities or tenderness, respiratory effort normal   Lung: clear to auscultation bilaterally   Heart:  normal rate and regular rhythm, S1 and S2 normal, no gallops noted, soft systolic ejection murmur    Abdomen: soft, non-tender. Bowel sounds normal. No masses,  no organomegaly   Extremities: extremities normal, atraumatic, no cyanosis or edema Skin: no rashes   Neuro: alert, oriented, normal speech, no focal findings or movement disorder noted     EK19. Second-degree AV block with 2-1 conduction. Poor R wave progression. Diffuse nondiagnostic ST and T wave abnormalities. 2022. Sinus rhythm with 2-1 AV block. Lead switch. Assessment/Plan:       ICD-10-CM ICD-9-CM    1. Essential hypertension with goal blood pressure less than 140/90. Blood pressure controlled in the office today. I10 401.9 AMB POC EKG ROUTINE W/ 12 LEADS, INTER & REP   2. Palpitations R00.2 785.1 TSH AND FREE T4      ECHO ADULT COMPLETE      CARDIAC HOLTER MONITOR   3. First degree heart block, history of I44.0 426.11    4. Bradycardia R00.1 427.89    5. Heart murmur R01.1 785.2    6. Second degree AV block, resting electrocardiogram in the office at her prior visit of 2022 demonstrated bradycardia with a heart rate of 46. There was 2-1 block. She had similar findings at a previous visit of 2019. Ordered an echocardiogram which was completed on 2022. EF was 60 to 65%. There was mild to moderate TR. There was mild MR.   The right ventricle was reported as being mild to

## 2023-06-01 ENCOUNTER — TELEPHONE (OUTPATIENT)
Age: 48
End: 2023-06-01

## 2023-08-09 ENCOUNTER — OFFICE VISIT (OUTPATIENT)
Age: 48
End: 2023-08-09

## 2023-08-09 VITALS
SYSTOLIC BLOOD PRESSURE: 187 MMHG | DIASTOLIC BLOOD PRESSURE: 94 MMHG | HEIGHT: 64 IN | OXYGEN SATURATION: 99 % | BODY MASS INDEX: 35.51 KG/M2 | HEART RATE: 54 BPM | WEIGHT: 208 LBS

## 2023-08-09 DIAGNOSIS — I44.0 ATRIOVENTRICULAR BLOCK, FIRST DEGREE: Primary | ICD-10-CM

## 2023-08-09 RX ORDER — VALSARTAN 80 MG/1
80 TABLET ORAL DAILY
COMMUNITY

## 2023-08-09 ASSESSMENT — PATIENT HEALTH QUESTIONNAIRE - PHQ9
2. FEELING DOWN, DEPRESSED OR HOPELESS: 0
1. LITTLE INTEREST OR PLEASURE IN DOING THINGS: 0
SUM OF ALL RESPONSES TO PHQ QUESTIONS 1-9: 0
SUM OF ALL RESPONSES TO PHQ QUESTIONS 1-9: 0
SUM OF ALL RESPONSES TO PHQ9 QUESTIONS 1 & 2: 0
SUM OF ALL RESPONSES TO PHQ QUESTIONS 1-9: 0
SUM OF ALL RESPONSES TO PHQ QUESTIONS 1-9: 0

## 2023-08-09 NOTE — PROGRESS NOTES
Shay Batista presents today for   Chief Complaint   Patient presents with    Follow-up     clearance       Gerardo Perry preferred language for health care discussion is english/other. Is someone accompanying this pt? no    Is the patient using any DME equipment during OV? no    Depression Screening:  Depression: Not at risk    PHQ-2 Score: 0        Learning Assessment:  Who is the primary learner? Patient    What is the preferred language for health care of the primary learner? ENGLISH    How does the primary learner prefer to learn new concepts? DEMONSTRATION    Answered By patient    Relationship to Learner SELF           Pt currently taking Anticoagulant therapy? no    Pt currently taking Antiplatelet therapy ? no      Coordination of Care:  1. Have you been to the ER, urgent care clinic since your last visit? Hospitalized since your last visit? no    2. Have you seen or consulted any other health care providers outside of the [address] [place] [address] since your last visit? Include any pap smears or colon screening.  no

## 2023-09-11 ENCOUNTER — TELEPHONE (OUTPATIENT)
Age: 48
End: 2023-09-11

## 2023-09-11 NOTE — TELEPHONE ENCOUNTER
----- Message from Xin Beard sent at 9/6/2023  2:34 PM EDT -----  Patient called and asked to speak to the nurse and states if you could give her a call her callback number is [phone]. Please advise.